# Patient Record
(demographics unavailable — no encounter records)

---

## 2025-02-07 NOTE — REVIEW OF SYSTEMS
[Negative] : Heme/Lymph [Abdominal Pain] : abdominal pain [Heartburn] : heartburn [Fever] : no fever [Chills] : no chills [Recent Weight Gain (___ Lbs)] : no recent weight gain [Recent Weight Loss (___ Lbs)] : no recent weight loss [Vomiting] : no vomiting [Constipation] : no constipation [Diarrhea] : no diarrhea [Melena (black stool)] : no melena [Bleeding] : no bleeding [Fecal Incontinence (soiling)] : no fecal incontinence [Bloating (gassiness)] : no bloating

## 2025-02-07 NOTE — PHYSICAL EXAM
[Alert] : alert [Normal Voice/Communication] : normal voice/communication [Healthy Appearing] : healthy appearing [No Acute Distress] : no acute distress [No Respiratory Distress] : no respiratory distress [Abdomen Tenderness] : non-tender [No Masses] : no abdominal mass palpated [Abdomen Soft] : soft [Abnormal Walk] : normal gait [Oriented To Time, Place, And Person] : oriented to person, place, and time

## 2025-02-07 NOTE — ASSESSMENT
[FreeTextEntry1] : 29 y/o with PMH of septoplasty presents for new complaints of reflux/heartburn and epigastric pain.  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 Discussed R/A/I/B with patient. Education provided on preparation instructions and the need for an escort.   F/u post-procedure or with new or worsening GI s/s  Kat CAMILO NP, am scribing for and in the presence of Dr. Mario Rivera the following sections: history of present illness, past medical/family/social history; review of systems; vital signs; physical exam; disposition.  Mario CAMILO MD, personally performed the services described in the documentation, reviewed the documentation recorded by the scribe in my presence and it accurately and completely records my words and actions.